# Patient Record
Sex: MALE | Race: WHITE | NOT HISPANIC OR LATINO | URBAN - METROPOLITAN AREA
[De-identification: names, ages, dates, MRNs, and addresses within clinical notes are randomized per-mention and may not be internally consistent; named-entity substitution may affect disease eponyms.]

---

## 2021-01-12 ENCOUNTER — OFFICE VISIT - HEALTHEAST (OUTPATIENT)
Dept: OTOLARYNGOLOGY | Facility: CLINIC | Age: 26
End: 2021-01-12

## 2021-01-12 DIAGNOSIS — E66.01 MORBID OBESITY (H): ICD-10-CM

## 2021-01-12 DIAGNOSIS — R06.83 LOUD SNORING: ICD-10-CM

## 2021-01-12 DIAGNOSIS — H73.892 RETRACTION OF TYMPANIC MEMBRANE OF LEFT EAR: ICD-10-CM

## 2021-01-12 DIAGNOSIS — R04.0 EPISTAXIS: ICD-10-CM

## 2021-01-12 DIAGNOSIS — J31.0 RHINITIS, UNSPECIFIED TYPE: ICD-10-CM

## 2021-01-12 DIAGNOSIS — J35.1 TONSILLAR HYPERTROPHY: ICD-10-CM

## 2021-01-12 DIAGNOSIS — J34.2 NASAL SEPTAL DEVIATION: ICD-10-CM

## 2021-01-12 RX ORDER — AZELASTINE 1 MG/ML
SPRAY, METERED NASAL
Qty: 30 ML | Refills: 12 | Status: SHIPPED | OUTPATIENT
Start: 2021-01-12

## 2021-02-15 ENCOUNTER — AMBULATORY - HEALTHEAST (OUTPATIENT)
Dept: OTOLARYNGOLOGY | Facility: CLINIC | Age: 26
End: 2021-02-15

## 2021-02-15 DIAGNOSIS — H73.892 RETRACTION OF TYMPANIC MEMBRANE OF LEFT EAR: ICD-10-CM

## 2021-05-01 ENCOUNTER — HEALTH MAINTENANCE LETTER (OUTPATIENT)
Age: 26
End: 2021-05-01

## 2021-06-14 NOTE — PROGRESS NOTES
CHIEF COMPLAINT: Nose Problem      HISTORY OF PRESENT ILLNESS    [place] was seen for removal of nasal packing. Patient was recently seen in the ER for acute epistaxis and now presents for packing removal   He has a history of epistaxis in the past.  He also has other concerns today.  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 He denies hearing loss or ear pressure.  He denies morning headache.   He does not have an allergy history.   He is concerned about his weight.   He is hoping to avoid additional nosebleeds.  He is interested in counseling for weight loss.  He does have some nasal congestion at night.     1/7 ED Visit:     1. Epistaxis      ED COURSE & MEDICAL DECISION MAKING      25 y.o. male  presents to the Emergency Department for evaluation of nosebleed. Initial Vitals Reviewed. Initial exam notable for obese male with a brisk left-sided nosebleed that is causing some right-sided dripping as well.  Unable to visualize exact source of bleeding.  No risk factors for posterior bleed, no significant hypertension.  He has had nosebleeds in the past that have required ER visits.  We initially attempted direct pressure with Afrin and he continued to bleed.  Anterior Rhino Rocket was placed with resolution of bleeding.  At this time I am going to discharge with sinus precautions, ENT follow-up, and a prescription for Keflex.  Return precautions were given and patient was comfortable with discharge plan.        REVIEW OF SYSTEMS    Review of Systems: a 10-system review is reviewed at this encounter.  See scanned document.     Patient has no known allergies.       There were no vitals filed for this visit.  :.    PHYSICAL EXAM:        HEAD: Normal appearance and symmetry:  No cutaneous lesions.      EARS:    Normal TM's bilaterally. Normal auditory canals and external ears. Non-tender.  Left TM apppear retracted         NOSE:    Dorsum:   straight  Septum: normal and deviated to left (mid septal  spur)  Mucosa:  Small area of mucosal breakdown midseptum left (cauterized with silver nitrate)  Inferior turbinates:  Mod hypertrophy       ORAL CAVITY/OROPHARYNX:    Lips:  Normal.  Tongue: normal, midline  Mucosa:   no lesions  Tonsils:  3+     NECK:  Trachea:  midline.   Thyroid:  normal   Adenopathy:  none       NEURO:   Alert and Oriented    GAIT AND STATION:  normal     RESPIRATORY:   Symmetry and Respiratory effort    PSYCH:   normal mood and affect    SKIN:  warm and dry         IMPRESSION:    Encounter Diagnoses   Name Primary?     Epistaxis Yes     Rhinitis, unspecified type      Loud snoring      Retraction of tympanic membrane of left ear      Tonsillar hypertrophy      Nasal septal deviation      Morbid obesity (H)           RECOMMENDATIONS:  Astelin nasal spray as directed (use nightly for the first 2 weeks)  Sleep study referral (suspect MONIQUE)  Return visit 1 month to review sleep study and audiogram  Audiogram next visit    Orders Placed This Encounter   Procedures     Ambulatory referral to Sleep Studies     Referral Priority:   Routine     Referral Type:   Consultation     Referral Reason:   Evaluation and Treatment     Requested Specialty:   Sleep Medicine     Number of Visits Requested:   1     Ambulatory referral to Pediatric Bariatric [facility]     Referral Priority:   Routine     Referral Type:   Bariatric     Referral Reason:   Evaluation and Treatment     Referred to Provider:   Santana Berry DO     Number of Visits Requested:   1      Medications Ordered   Medications     azelastine (ASTELIN) 137 mcg (0.1 %) nasal spray     Sig: Use in each nostril as directed     Dispense:  30 mL     Refill:  12     2 spray each nostril 1-2x daily as needed for nasal congestion (use nightly for first weeks)

## 2021-06-18 NOTE — PATIENT INSTRUCTIONS - HE
Patient Instructions by Jose Enrique Valladares MD at 1/12/2021 11:00 AM     Author: Jose Enrique Valladares MD Service: -- Author Type: Physician    Filed: 1/12/2021 11:28 AM Encounter Date: 1/12/2021 Status: Signed    : Jose Enrique Valladares MD (Physician)       Astelin nasal spray as directed (use nightly for the first 2 weeks)  Sleep study referral  Return visit 1 month  Audiogram next visit    Patient Education     Obstructive Sleep Apnea  Obstructive sleep apnea is a condition that causes your air passages to become narrowed or blocked during sleep. As a result, breathing stops for short periods. Your body wakes up enough for breathing to begin again, though you don't remember it. The cycle of stopped breathing and brief awakenings can repeat dozens of times a night. This prevents the body from getting to the deeper stages of sleep that are needed for good rest and may cause your body's oxygen level to fall.  Signs of sleep apnea include loud snoring, noisy breathing, and gasping sounds during sleep. Daytime symptoms include waking up tired after a full night's sleep, waking up with headaches, feeling very sleepy or falling asleep during the day, and having problems with memory or concentration.  Risk factors for sleep apnea include:    Being overweight    Being a man, or a woman in menopause    Smoking    Using alcohol or sedating medicines    Having enlarged structures in the nose or throat  Home care  Lifestyle changes that can help treat snoring and sleep apnea include the following:    If you are overweight, lose weight. Talk to your healthcare provider about a weight-loss plan for you.    Avoid alcohol for 3 to 4 hours before bedtime. Avoid sedating medications. Ask your healthcare provider about the medicines you take.    If you smoke, talk to your healthcare provider about ways to quit.    Sleep on your side. This can help prevent gravity from pulling relaxed throat tissues into your breathing passages.    If you have  allergies or sinus problems that block your nose, ask your healthcare provider for help.  Follow-up care  Follow up with your healthcare provider, or as advised. A diagnosis of sleep apnea is made with a sleep study. Your healthcare provider can tell you more about this test.  When to seek medical advice  Sleep apnea can make you more likely to have certain health problems. These include high blood pressure, heart attack, stroke, and sexual dysfunction. If you have sleep apnea, talk to your healthcare provider about the best treatments for you.  Date Last Reviewed: 4/1/2017 2000-2017 [employer]. [address] [place] [id number]. All rights reserved. This information is not intended as a substitute for professional medical care. Always follow your healthcare professional's instructions.

## 2021-10-11 ENCOUNTER — HEALTH MAINTENANCE LETTER (OUTPATIENT)
Age: 26
End: 2021-10-11

## 2022-05-13 ENCOUNTER — HOSPITAL ENCOUNTER (EMERGENCY)
Facility: CLINIC | Age: 27
Discharge: LEFT BEFORE DISCHARGE | End: 2022-05-13
Admitting: PHYSICIAN ASSISTANT

## 2022-05-13 VITALS
SYSTOLIC BLOOD PRESSURE: 192 MMHG | HEART RATE: 97 BPM | RESPIRATION RATE: 16 BRPM | OXYGEN SATURATION: 97 % | TEMPERATURE: 98.7 F | DIASTOLIC BLOOD PRESSURE: 113 MMHG

## 2022-05-13 PROCEDURE — 250N000013 HC RX MED GY IP 250 OP 250 PS 637: Performed by: PHYSICIAN ASSISTANT

## 2022-05-13 PROCEDURE — 999N000104 HC STATISTIC NO CHARGE

## 2022-05-13 RX ADMIN — PHENYLEPHRINE HYDROCHLORIDE 1 DROP: 0.5 SPRAY NASAL at 16:26

## 2022-05-13 NOTE — ED TRIAGE NOTES
pts nose stopped bleeding after using Afrin.  He elected to leave [facility]     Triage Assessment     Row Name 05/13/22 2203       Triage Assessment (Adult)    Airway WDL WDL       Respiratory WDL    Respiratory WDL WDL       Skin Circulation/Temperature WDL    Skin Circulation/Temperature WDL WDL       Cardiac WDL    Cardiac WDL WDL       Peripheral/Neurovascular WDL    Peripheral Neurovascular WDL WDL       Cognitive/Neuro/Behavioral WDL    Cognitive/Neuro/Behavioral WDL WDL

## 2022-05-13 NOTE — ED TRIAGE NOTES
Gets nose bleeds often.  Usually has afrin but was at work and had none     Triage Assessment     Row Name 05/13/22 5121       Triage Assessment (Adult)    Airway WDL WDL       Respiratory WDL    Respiratory WDL WDL       Skin Circulation/Temperature WDL    Skin Circulation/Temperature WDL WDL       Cardiac WDL    Cardiac WDL WDL       Peripheral/Neurovascular WDL    Peripheral Neurovascular WDL WDL       Cognitive/Neuro/Behavioral WDL    Cognitive/Neuro/Behavioral WDL WDL

## 2022-05-22 ENCOUNTER — HEALTH MAINTENANCE LETTER (OUTPATIENT)
Age: 27
End: 2022-05-22

## 2022-09-24 ENCOUNTER — HEALTH MAINTENANCE LETTER (OUTPATIENT)
Age: 27
End: 2022-09-24

## 2023-06-04 ENCOUNTER — HEALTH MAINTENANCE LETTER (OUTPATIENT)
Age: 28
End: 2023-06-04